# Patient Record
Sex: FEMALE | Race: WHITE | URBAN - METROPOLITAN AREA
[De-identification: names, ages, dates, MRNs, and addresses within clinical notes are randomized per-mention and may not be internally consistent; named-entity substitution may affect disease eponyms.]

---

## 2024-05-06 ENCOUNTER — TELEPHONE (OUTPATIENT)
Dept: OPHTHALMOLOGY | Facility: CLINIC | Age: 84
End: 2024-05-06

## 2024-05-06 NOTE — TELEPHONE ENCOUNTER
"[facility]    Phone Message    May a detailed message be left on voicemail: yes     Reason for Call: Other: Patient is looking for an \"experienced\" provider who performs either a Keratectomy or Keratotomy she was a little confused on the name. But, she states she needs the surgery and she is afraid and is looking for an experienced provider.  Please call.  Thank you.     Action Taken: Message routed to:  [facility] Ophthalmology    Travel Screening: Not Applicable                                                                   "

## 2024-05-06 NOTE — TELEPHONE ENCOUNTER
"Called and spoke to Joanna     She has seen a Cornea provider in the past, but declined to send the notes or imaging     She does not want the new cornea providers be influenced by her other cornea providers finding -\" that's how she feels\"    Offered to schedule an appointment at our clinic - joanna declined and will go to [place] to be seen by general ophthalmologist -     MICKY [place] / [place] -     Would you see her without the notes ?     Thanks,     V  "

## 2024-05-07 ENCOUNTER — TELEPHONE (OUTPATIENT)
Dept: OPHTHALMOLOGY | Facility: CLINIC | Age: 84
End: 2024-05-07
Payer: MEDICARE

## 2024-05-07 NOTE — TELEPHONE ENCOUNTER
Called and LVM     Per cornea - ok to schedule if If she knows her history very well and can provide details then it is okay for herto be seen without outside records.     Ok to schedule in a new cornea spot for next available     Mary Kate Lopez Communication Facilitator on 5/7/2024 at 11:14 AM

## 2024-05-08 ENCOUNTER — TRANSCRIBE ORDERS (OUTPATIENT)
Dept: OTHER | Age: 84
End: 2024-05-08

## 2024-05-08 DIAGNOSIS — H52.4 PRESBYOPIA: ICD-10-CM

## 2024-05-08 DIAGNOSIS — H52.03 HYPEROPIA, BILATERAL: ICD-10-CM

## 2024-05-08 DIAGNOSIS — H47.233 OPTIC CUPPING OF BOTH EYES: Primary | ICD-10-CM

## 2024-08-26 ENCOUNTER — OFFICE VISIT (OUTPATIENT)
Dept: OPHTHALMOLOGY | Facility: CLINIC | Age: 84
End: 2024-08-26
Attending: OPHTHALMOLOGY
Payer: OTHER GOVERNMENT

## 2024-08-26 DIAGNOSIS — H52.03 HYPEROPIA, BILATERAL: ICD-10-CM

## 2024-08-26 DIAGNOSIS — H52.4 PRESBYOPIA: ICD-10-CM

## 2024-08-26 DIAGNOSIS — H47.233 OPTIC CUPPING OF BOTH EYES: ICD-10-CM

## 2024-08-26 PROCEDURE — 99204 OFFICE O/P NEW MOD 45 MIN: CPT | Mod: GC | Performed by: OPHTHALMOLOGY

## 2024-08-26 PROCEDURE — 92015 DETERMINE REFRACTIVE STATE: CPT

## 2024-08-26 PROCEDURE — G0463 HOSPITAL OUTPT CLINIC VISIT: HCPCS | Performed by: OPHTHALMOLOGY

## 2024-08-26 RX ORDER — LOSARTAN POTASSIUM 50 MG/1
50 TABLET ORAL DAILY
COMMUNITY

## 2024-08-26 RX ORDER — MULTIVITAMIN WITH IRON
1 TABLET ORAL DAILY
COMMUNITY

## 2024-08-26 RX ORDER — LEVOTHYROXINE SODIUM 75 UG/1
75 TABLET ORAL DAILY
COMMUNITY

## 2024-08-26 RX ORDER — CARVEDILOL 25 MG/1
25 TABLET ORAL EVERY EVENING
COMMUNITY

## 2024-08-26 RX ORDER — HYDROCHLOROTHIAZIDE 25 MG/1
25 TABLET ORAL DAILY
COMMUNITY

## 2024-08-26 ASSESSMENT — CONF VISUAL FIELD
OD_SUPERIOR_TEMPORAL_RESTRICTION: 0
OS_SUPERIOR_NASAL_RESTRICTION: 0
OS_NORMAL: 1
OS_INFERIOR_NASAL_RESTRICTION: 0
OD_SUPERIOR_NASAL_RESTRICTION: 0
OS_SUPERIOR_TEMPORAL_RESTRICTION: 0
OD_INFERIOR_TEMPORAL_RESTRICTION: 0
METHOD: COUNTING FINGERS
OD_INFERIOR_NASAL_RESTRICTION: 0
OD_NORMAL: 1
OS_INFERIOR_TEMPORAL_RESTRICTION: 0

## 2024-08-26 ASSESSMENT — VISUAL ACUITY
OD_SC+: -2
OD_PH_SC: 20/25
OS_SC: 20/25
METHOD: SNELLEN - LINEAR
OS_SC+: -2
OD_SC: 20/30

## 2024-08-26 ASSESSMENT — EXTERNAL EXAM - LEFT EYE: OS_EXAM: NORMAL

## 2024-08-26 ASSESSMENT — EXTERNAL EXAM - RIGHT EYE: OD_EXAM: NORMAL

## 2024-08-26 ASSESSMENT — SLIT LAMP EXAM - LIDS
COMMENTS: NORMAL
COMMENTS: NORMAL

## 2024-08-26 ASSESSMENT — TONOMETRY
IOP_METHOD: ICARE
OS_IOP_MMHG: 12
OD_IOP_MMHG: 15

## 2024-08-26 NOTE — NURSING NOTE
Chief Complaints and History of Present Illnesses   Patient presents with    Consult For     Corneal evaluation referred by Dr. Dyer     Chief Complaint(s) and History of Present Illness(es)       Consult For              Laterality: both eyes    Course: gradually worsening    Associated symptoms: glare and dryness.  Negative for eye pain and headache    Treatments tried: artificial tears    Pain scale: 0/10    Comments: Corneal evaluation referred by Dr. Dyer              Comments    She states that at past eye exams, corneal surgeries have been recommended for her eyes, however, she put it off these procedures.  She states that her vision has seemed worse in each eye over the past few years.  Her distance vision seems less clear.     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      Blanca Finley, COT 1:32 PM  August 26, 2024

## 2024-08-26 NOTE — PROGRESS NOTES
Chief complaint     Chief Complaints and History of Present Illnesses   Patient presents with    Consult For     Corneal evaluation referred by Dr. Dyer       Referring Provider: Dr. Dyer, images, labs, and notes were reviewed in detail.    HPI    Joanna Rodrigez 84 year old female     Interval hx 08/26/2024: Patient has a history of EBMD. She was recommend SK OU, but she would like a second opinion. Patient denies any epithelial erosions, or severe ocular pain. Patient does have some starbursts at night  Chief Complaint(s) and History of Present Illness(es)       Consult For    In both eyes.  Since onset it is gradually worsening.  Associated symptoms include glare and dryness.  Negative for eye pain and headache.  Treatments tried include artificial tears.  Pain was noted as 0/10. Additional comments: Corneal evaluation referred by Dr. Dyer             Comments    She states that at past eye exams, corneal surgeries have been recommended for her eyes, however, she put it off these procedures.  She states that her vision has seemed worse in each eye over the past few years.  Her distance vision seems less clear.     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      Blanca Finley, COT 1:32 PM  August 26, 2024                          Past ocular history   Prior eye surgery/laser/Trauma: Cataract surgery OU   CTL wearer: None  Glasses : Yes   Family Hx of eye disease:None     PMH     Past Medical History:   Diagnosis Date    Hypertension     Hypothyroidism        PSH     Past Surgical History:   Procedure Laterality Date    CATARACT IOL, RT/LT         Meds     Current Outpatient Medications   Medication Sig Dispense Refill    carvedilol (COREG) 25 MG tablet Take 25 mg by mouth every evening.      hydrochlorothiazide (HYDRODIURIL) 25 MG tablet Take 25 mg by mouth daily.      levothyroxine (SYNTHROID/LEVOTHROID) 75 MCG tablet Take 75 mcg by mouth daily.      losartan (COZAAR) 50 MG  tablet Take 50 mg by mouth daily.      magnesium 250 MG tablet Take 1 tablet by mouth daily.       No current facility-administered medications for this visit.       Labs   -    Imaging   -     Drops Currently Taking   Artificial tears BID OU     Assessment/Plan   # Map-dot-dystrophy OU   - Patient referred for evaluation of EBMD and possible superficial keratectomy   - Vision 20/20 OU with updated MRx OU  - Exam notable for very faint lines and epitheliopathy within in the pupillary margin - but not visually significant  - no history of recurrent erosions   - patient does have some starburst at night - but patient is not that bothered by it.  - would recommend monitoring     Follow up:  1 year locally, w/ V,T at next visit, call if problems sooner to clinic.      Marge Wellington MD  PGY-3  Department of Ophthalmology  August 26, 2024 2:26 PM     Attending Physician Attestation:  Complete documentation of historical and exam elements from today's encounter can be found in the full encounter summary report (not reduplicated in this progress note).  I personally obtained the chief complaint(s) and history of present illness.  I confirmed and edited as necessary the review of systems, past medical/surgical history, family history, social history, and examination findings as documented by others; and I examined the patient myself.  I personally reviewed the relevant tests, images, and reports as documented above.  I formulated and edited as necessary the assessment and plan and discussed the findings and management plan with the patient and family. - Christos Galleogs MD

## 2024-08-27 ENCOUNTER — TELEPHONE (OUTPATIENT)
Dept: OPHTHALMOLOGY | Facility: CLINIC | Age: 84
End: 2024-08-27
Payer: MEDICARE

## 2024-08-27 NOTE — TELEPHONE ENCOUNTER
[facility]    Phone Message    May a detailed message be left on voicemail: yes     Reason for Call: Other: Patient states she is at [facility] in [place] and the Rx only has reading, and they need the Distance faxed to [phone]. # [phone]     Action Taken: Message routed to:  [facility] Ophthalmology    Travel Screening: Not Applicable     Date of Service:

## 2024-08-28 ASSESSMENT — REFRACTION_MANIFEST
OS_CYLINDER: +1.25
OD_SPHERE: PLANO
OD_AXIS: 178
OD_CYLINDER: +0.75
OS_ADD: +2.50
OD_CYLINDER: +0.75
OS_SPHERE: +3.25
OS_AXIS: 014
OS_CYLINDER: +1.25
OS_SPHERE: +0.75
OD_ADD: +2.50
OS_AXIS: 014
OD_SPHERE: +2.50
OD_AXIS: 178

## 2024-08-29 NOTE — TELEPHONE ENCOUNTER
Patients glasses MRx has been updated and Faxed to [facility] [phone] .    Above per ophthalmic technician.    Alan Marsh RN 7:56 AM 08/29/24